# Patient Record
Sex: MALE | Race: WHITE | Employment: UNEMPLOYED | URBAN - METROPOLITAN AREA
[De-identification: names, ages, dates, MRNs, and addresses within clinical notes are randomized per-mention and may not be internally consistent; named-entity substitution may affect disease eponyms.]

---

## 2021-06-06 ENCOUNTER — HOSPITAL ENCOUNTER (EMERGENCY)
Facility: CLINIC | Age: 31
Discharge: HOME | End: 2021-06-07
Attending: EMERGENCY MEDICINE | Admitting: EMERGENCY MEDICINE

## 2021-06-06 VITALS
OXYGEN SATURATION: 98 % | HEART RATE: 75 BPM | TEMPERATURE: 99 F | RESPIRATION RATE: 22 BRPM | SYSTOLIC BLOOD PRESSURE: 125 MMHG | DIASTOLIC BLOOD PRESSURE: 68 MMHG

## 2021-06-06 DIAGNOSIS — F19.10 POLYSUBSTANCE ABUSE (H): ICD-10-CM

## 2021-06-06 LAB
ANION GAP SERPL CALCULATED.3IONS-SCNC: 5 MMOL/L (ref 3–14)
BASOPHILS # BLD AUTO: 0 10E9/L (ref 0–0.2)
BASOPHILS NFR BLD AUTO: 0.2 %
BUN SERPL-MCNC: 17 MG/DL (ref 7–30)
CALCIUM SERPL-MCNC: 9.2 MG/DL (ref 8.5–10.1)
CHLORIDE SERPL-SCNC: 105 MMOL/L (ref 94–109)
CO2 SERPL-SCNC: 27 MMOL/L (ref 20–32)
CREAT SERPL-MCNC: 1.07 MG/DL (ref 0.66–1.25)
DIFFERENTIAL METHOD BLD: ABNORMAL
EOSINOPHIL # BLD AUTO: 0.1 10E9/L (ref 0–0.7)
EOSINOPHIL NFR BLD AUTO: 0.9 %
ERYTHROCYTE [DISTWIDTH] IN BLOOD BY AUTOMATED COUNT: 12.2 % (ref 10–15)
ETHANOL SERPL-MCNC: <0.01 G/DL
GFR SERPL CREATININE-BSD FRML MDRD: >90 ML/MIN/{1.73_M2}
GLUCOSE SERPL-MCNC: 88 MG/DL (ref 70–99)
HCT VFR BLD AUTO: 43.6 % (ref 40–53)
HGB BLD-MCNC: 15.1 G/DL (ref 13.3–17.7)
IMM GRANULOCYTES # BLD: 0 10E9/L (ref 0–0.4)
IMM GRANULOCYTES NFR BLD: 0.3 %
LYMPHOCYTES # BLD AUTO: 1.8 10E9/L (ref 0.8–5.3)
LYMPHOCYTES NFR BLD AUTO: 14.2 %
MCH RBC QN AUTO: 30.4 PG (ref 26.5–33)
MCHC RBC AUTO-ENTMCNC: 34.6 G/DL (ref 31.5–36.5)
MCV RBC AUTO: 88 FL (ref 78–100)
MONOCYTES # BLD AUTO: 1.2 10E9/L (ref 0–1.3)
MONOCYTES NFR BLD AUTO: 9.6 %
NEUTROPHILS # BLD AUTO: 9.6 10E9/L (ref 1.6–8.3)
NEUTROPHILS NFR BLD AUTO: 74.8 %
NRBC # BLD AUTO: 0 10*3/UL
NRBC BLD AUTO-RTO: 0 /100
PLATELET # BLD AUTO: 332 10E9/L (ref 150–450)
POTASSIUM SERPL-SCNC: 3.7 MMOL/L (ref 3.4–5.3)
RBC # BLD AUTO: 4.97 10E12/L (ref 4.4–5.9)
SODIUM SERPL-SCNC: 137 MMOL/L (ref 133–144)
WBC # BLD AUTO: 12.9 10E9/L (ref 4–11)

## 2021-06-06 PROCEDURE — 90791 PSYCH DIAGNOSTIC EVALUATION: CPT

## 2021-06-06 PROCEDURE — 82077 ASSAY SPEC XCP UR&BREATH IA: CPT | Performed by: EMERGENCY MEDICINE

## 2021-06-06 PROCEDURE — 250N000011 HC RX IP 250 OP 636

## 2021-06-06 PROCEDURE — 85025 COMPLETE CBC W/AUTO DIFF WBC: CPT | Performed by: EMERGENCY MEDICINE

## 2021-06-06 PROCEDURE — 80048 BASIC METABOLIC PNL TOTAL CA: CPT | Performed by: EMERGENCY MEDICINE

## 2021-06-06 PROCEDURE — 99285 EMERGENCY DEPT VISIT HI MDM: CPT | Mod: 25

## 2021-06-06 PROCEDURE — 96361 HYDRATE IV INFUSION ADD-ON: CPT

## 2021-06-06 PROCEDURE — 96374 THER/PROPH/DIAG INJ IV PUSH: CPT

## 2021-06-06 PROCEDURE — 258N000003 HC RX IP 258 OP 636: Performed by: EMERGENCY MEDICINE

## 2021-06-06 PROCEDURE — 250N000013 HC RX MED GY IP 250 OP 250 PS 637: Performed by: EMERGENCY MEDICINE

## 2021-06-06 RX ORDER — OLANZAPINE 5 MG/1
10 TABLET, ORALLY DISINTEGRATING ORAL ONCE
Status: COMPLETED | OUTPATIENT
Start: 2021-06-06 | End: 2021-06-06

## 2021-06-06 RX ORDER — NALOXONE HYDROCHLORIDE 0.4 MG/ML
INJECTION, SOLUTION INTRAMUSCULAR; INTRAVENOUS; SUBCUTANEOUS
Status: COMPLETED
Start: 2021-06-06 | End: 2021-06-06

## 2021-06-06 RX ORDER — NALOXONE HYDROCHLORIDE 0.4 MG/ML
0.4 INJECTION, SOLUTION INTRAMUSCULAR; INTRAVENOUS; SUBCUTANEOUS ONCE
Status: COMPLETED | OUTPATIENT
Start: 2021-06-06 | End: 2021-06-06

## 2021-06-06 RX ORDER — LORAZEPAM 1 MG/1
2 TABLET ORAL ONCE
Status: COMPLETED | OUTPATIENT
Start: 2021-06-06 | End: 2021-06-06

## 2021-06-06 RX ADMIN — NALXONE HYDROCHLORIDE 0.4 MG: 0.4 INJECTION INTRAMUSCULAR; INTRAVENOUS; SUBCUTANEOUS at 19:41

## 2021-06-06 RX ADMIN — NALOXONE HYDROCHLORIDE 0.4 MG: 0.4 INJECTION, SOLUTION INTRAMUSCULAR; INTRAVENOUS; SUBCUTANEOUS at 19:41

## 2021-06-06 RX ADMIN — LORAZEPAM 2 MG: 1 TABLET ORAL at 06:01

## 2021-06-06 RX ADMIN — SODIUM CHLORIDE 1000 ML: 9 INJECTION, SOLUTION INTRAVENOUS at 06:37

## 2021-06-06 RX ADMIN — OLANZAPINE 10 MG: 5 TABLET, ORALLY DISINTEGRATING ORAL at 06:30

## 2021-06-06 ASSESSMENT — ENCOUNTER SYMPTOMS
HYPERACTIVE: 1
AGITATION: 1
APPETITE CHANGE: 0

## 2021-06-06 NOTE — ED PROVIDER NOTES
History     Chief Complaint:  Mental Health Problem    The history is provided by the EMS personnel, the patient and the police.      Tristan Cedeño is a 30 year old male who presents with a mental health problem. Tristan presents via EMS after he was reportedly seen wandering between apartments yelling and acting erratic. Tristan does not think he needs to be in the ER and denies any medical concerns including suicidal or homicidal ideation. He would like to be discharged. He admits to regular use of methamphetamines, heroin, and fentanyl with last use of all within the last day, most recently heroin and fentanyl 2 hours prior to arrival. He also drank half a beer last night.     Review of Systems   Constitutional: Negative for appetite change.   Psychiatric/Behavioral: Positive for agitation and behavioral problems. Negative for suicidal ideas. The patient is hyperactive.    All other systems reviewed and are negative.    Allergies:  No Known Drug Allergies    Medications:    Medications reviewed. No current medications.     Past Medical History:    Substance abuse     Past Surgical History:    Surgical history reviewed. No pertinent surgical history.    Family History:    Family history reviewed. No pertinent family history.     Social:  Presents via EMS   Endorses use of amphetamines, fentanyl, and heroin with last use of all within the last 24 hours.  Alcohol use as per HPI.    Physical Exam     Patient Vitals for the past 24 hrs:   BP Temp Temp src Pulse Resp SpO2   06/06/21 1400 125/68 -- -- 75 -- 98 %   06/06/21 0557 (!) 147/80 99  F (37.2  C) Oral 110 22 96 %       Physical Exam  General: Well-developed and well-nourished. Intoxicated appearing young  man. Cooperative.  Head:  Atraumatic.  Eyes:  Conjunctivae, lids, and sclerae are normal.  Neck:  Supple. Normal range of motion.  CV:  Tachycardic rate and regular rhythm. Normal heart sounds with no murmurs, rubs, or gallops detected.  Resp:  No  respiratory distress. Clear to auscultation bilaterally without decreased breath sounds, wheezing, rales, or rhonchi.  GI:  Soft. Non-distended. Non-tender.    MS:  Normal ROM. No bilateral lower extremity edema.  Skin:  Warm. Non-diaphoretic. No pallor.  Neuro: Awake. A&Ox3. Normal strength.  Psych:  Normal mood and affect. Normal speech. No suicidal ideation. Psychomotor agitation and restlessness.  Not responding to internal stimuli.  Vitals reviewed.    Emergency Department Course     Laboratory:    CBC: WBC 12.9(H), HGB 15.1,      BMP: WNL (Creatinine 1.07)     Alcohol Ethyl: <0.01     Urine Drug screen: ordered    Emergency Department Course:    Reviewed:  I reviewed nursing notes and vitals.    Assessments:  0625 I obtained history and examined the patient as noted above.     0911 I returned to check on patient.  He is sleeping comfortably.     1255 I returned to check on patient.  The patient is sleeping comfortably.     1358 I returned to check on patient.  He was sleeping. When I awoke him he has no needs. He will try to call for a ride home.     Interventions:  0601 Ativan 2 mg oral     0637 NS, 1 L, IV    0630 Zyprexa 10 mg oral     Disposition:  Care of the patient was transferred to my colleague, Dr. Gallegos.     Impression & Plan    Medical Decision Making:  Tristan is a 30-year-old man who was reportedly behaving bizarrely prompting call to first responders and ultimate ER visit.  Tristan himself endorses use of methamphetamines, heroin, and fentanyl all within the last 24 hours.  He does not have any specific concerns, however.  He specifically denies suicidal ideation.  On exam, he is mildly tachycardic and appears under the influence of substances.  His examp is more consistent with sympathomimetic rather than opiate intoxication.  As such, for his agitation he required both Ativan and Zyprexa with appropriate improvement.  Laboratory studies reveal a leukocytosis to 12.9 which is likely  related to his agitation and drug use.  There is no kidney injury, electrolyte derangements, and undetectable blood alcohol level.  There is no evidence of trauma and he required no further interventions.  He was able to sleep and eat while under my care and at the end of my shift continues to clear from his substances.  Urine drug screen is pending.  He was endorsed my partner, Dr. Gallegos, pending sobriety or safe ride home for discharge.    Covid-19  Tristan Cedeño was evaluated during a global COVID-19 pandemic, which necessitated consideration that the patient might be at risk for infection with the SARS-CoV-2 virus that causes COVID-19.   Applicable protocols for evaluation were followed during the patient's care.     Diagnosis:    ICD-10-CM    1. Polysubstance abuse (H)  F19.10          Scribe Disclosure:  Susan MAKI, am serving as a scribe at 7:39 AM on 6/6/2021 to document services personally performed by Arlene Carrizales MD based on my observations and the provider's statements to me.      Arlene Carrizales MD  06/06/21 1947
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
## 2021-06-07 NOTE — ED PROVIDER NOTES
Received sign out from Dr. Gallegos, to follow up on DEC and sobriety. Per Dec OK to discharge   Now awake and alert ambulating and clinically sober.He denies any family history of ulcer disease or gallbladder disease.  He denies suicidal ideation and is appropriate for discharge      Samuel Alaniz MD  06/07/21 0228

## 2021-06-07 NOTE — ED NOTES
Emergency Department Patient Sign-out       Brief HPI:  This is a 30 year old male signed out to me by Dr. Carrizales .  See initial ED Provider note for details of the presentation. Patient is clearly under the influence of drugs.     Significant Events prior to my assuming care: Zyprexa and Ativan.      Exam:   Patient Vitals for the past 24 hrs:   BP Temp Temp src Pulse Resp SpO2   06/06/21 1400 125/68 -- -- 75 -- 98 %   06/06/21 0557 (!) 147/80 99  F (37.2  C) Oral 110 22 96 %           ED RESULTS:   Results for orders placed or performed during the hospital encounter of 06/06/21 (from the past 24 hour(s))   CBC with platelets differential     Status: Abnormal    Collection Time: 06/06/21  6:07 AM   Result Value Ref Range    WBC 12.9 (H) 4.0 - 11.0 10e9/L    RBC Count 4.97 4.4 - 5.9 10e12/L    Hemoglobin 15.1 13.3 - 17.7 g/dL    Hematocrit 43.6 40.0 - 53.0 %    MCV 88 78 - 100 fl    MCH 30.4 26.5 - 33.0 pg    MCHC 34.6 31.5 - 36.5 g/dL    RDW 12.2 10.0 - 15.0 %    Platelet Count 332 150 - 450 10e9/L    Diff Method Automated Method     % Neutrophils 74.8 %    % Lymphocytes 14.2 %    % Monocytes 9.6 %    % Eosinophils 0.9 %    % Basophils 0.2 %    % Immature Granulocytes 0.3 %    Nucleated RBCs 0 0 /100    Absolute Neutrophil 9.6 (H) 1.6 - 8.3 10e9/L    Absolute Lymphocytes 1.8 0.8 - 5.3 10e9/L    Absolute Monocytes 1.2 0.0 - 1.3 10e9/L    Absolute Eosinophils 0.1 0.0 - 0.7 10e9/L    Absolute Basophils 0.0 0.0 - 0.2 10e9/L    Abs Immature Granulocytes 0.0 0 - 0.4 10e9/L    Absolute Nucleated RBC 0.0    Basic metabolic panel     Status: None    Collection Time: 06/06/21  6:07 AM   Result Value Ref Range    Sodium 137 133 - 144 mmol/L    Potassium 3.7 3.4 - 5.3 mmol/L    Chloride 105 94 - 109 mmol/L    Carbon Dioxide 27 20 - 32 mmol/L    Anion Gap 5 3 - 14 mmol/L    Glucose 88 70 - 99 mg/dL    Urea Nitrogen 17 7 - 30 mg/dL    Creatinine 1.07 0.66 - 1.25 mg/dL    GFR Estimate >90 >60 mL/min/[1.73_m2]    GFR Estimate  If Black >90 >60 mL/min/[1.73_m2]    Calcium 9.2 8.5 - 10.1 mg/dL   Alcohol ethyl     Status: None    Collection Time: 06/06/21  6:07 AM   Result Value Ref Range    Ethanol g/dL <0.01 <0.01 g/dL       ED MEDICATIONS:   Medications   LORazepam (ATIVAN) tablet 2 mg (2 mg Oral Given 6/6/21 0601)   0.9% sodium chloride BOLUS (1,000 mLs Intravenous New Bag 6/6/21 0637)   OLANZapine zydis (zyPREXA) ODT tab 10 mg (10 mg Oral Given 6/6/21 0630)   naloxone (NARCAN) injection 0.4 mg (0.4 mg Intravenous Given 6/6/21 1941)         Impression:    ICD-10-CM    1. Polysubstance abuse (H)  F19.10        Plan:     Awaiting sobriety or safe discharge.    Went to evaluate patient and he was still likely under the influence of methamphetamines but was able to converse with me.  We discussed getting a ride.  I walked away and then at some point was called into the room.  He was clearly apneic and pinpoint pupils.  I did not have a BVM available so I jaw thrust and and called for Narcan.  Pulse ox was likely in the SPO2 of 60%.  Fortunately start an IV so the nurse came in with point for Narcan.  Shortly after this he woke up and was awake and talking.  He clearly based on securities observation attempted to snort something and this was likely heroin.  At this point given that he overdosed and went apneic in the ER we will have DEC evaluate the patient. DEC did talk to him but he kept falling asleep.  We will have him be evaluated once more sober.    MD El Ojeda Haley, MD  06/06/21 [id number]

## 2021-06-07 NOTE — ED NOTES
Pt was seen by security fidgeting with his hands in his lap, then looked like he was chewing something, then brought something to his nose. At this time the officer went into the patient's room and asked what he was doing and what he had. The patient denied having anything. The officer stood in the room while this nurse went to find Dr. Gallegos and another male staff to search the patient. When the nurse came back the patient was less responsive sitting in bed snoring. This nurse found and  pulled Dr. Gallegos from another room to assess patient. Dr. Gallegos jaw thrusted patient while nurse grabbed narcan and security went to get a BVM. SPO2 dropped to 60%. At 1941 the narcan was administered and the patient became alert and talking. Patient was still denying drug use. Security had patient remove all personal clothing and change into hospital scrubs. Pt had a pack of cigarettes and a lighter in his underwear, and bag of sour patch kids that were open. They also pulled everything prior out of the room and all linens were changed.